# Patient Record
Sex: FEMALE | Race: WHITE | NOT HISPANIC OR LATINO
[De-identification: names, ages, dates, MRNs, and addresses within clinical notes are randomized per-mention and may not be internally consistent; named-entity substitution may affect disease eponyms.]

---

## 2023-06-23 PROBLEM — Z00.00 ENCOUNTER FOR PREVENTIVE HEALTH EXAMINATION: Status: ACTIVE | Noted: 2023-06-23

## 2023-07-14 ENCOUNTER — APPOINTMENT (OUTPATIENT)
Dept: VASCULAR SURGERY | Facility: CLINIC | Age: 88
End: 2023-07-14
Payer: MEDICARE

## 2023-07-14 VITALS — SYSTOLIC BLOOD PRESSURE: 168 MMHG | HEART RATE: 51 BPM | HEIGHT: 66 IN | DIASTOLIC BLOOD PRESSURE: 76 MMHG

## 2023-07-14 PROCEDURE — 93923 UPR/LXTR ART STDY 3+ LVLS: CPT

## 2023-07-14 PROCEDURE — 99204 OFFICE O/P NEW MOD 45 MIN: CPT

## 2023-07-14 RX ORDER — ROSUVASTATIN CALCIUM 5 MG/1
TABLET, FILM COATED ORAL
Refills: 0 | Status: ACTIVE | COMMUNITY

## 2023-07-14 RX ORDER — METOPROLOL TARTRATE 50 MG/1
50 TABLET, FILM COATED ORAL
Refills: 0 | Status: ACTIVE | COMMUNITY

## 2023-07-14 NOTE — HISTORY OF PRESENT ILLNESS
[FreeTextEntry1] : Patient is an 89-year-old female presenting to us with complaints of left first toe pain especially at night.\par \par Patient denies any history of diabetes coronary arteries or smoking.\par \par Patient has minimal ambulation.  No history of tissue loss.\par \par No history of DVT, chest pain or shortness of breath.

## 2023-07-14 NOTE — PHYSICAL EXAM
[JVD] : no jugular venous distention  [Normal Breath Sounds] : Normal breath sounds [Normal Heart Sounds] : normal heart sounds [2+] : left 2+ [Ankle Swelling (On Exam)] : present [Ankle Swelling Bilaterally] : bilaterally  [Ankle Swelling On The Right] : mild [Abdomen Masses] : No abdominal masses [Skin Ulcer] : no ulcer

## 2023-07-14 NOTE — CONSULT LETTER
[Dear  ___] : Dear  [unfilled], [Consult Letter:] : I had the pleasure of evaluating your patient, [unfilled]. [Please see my note below.] : Please see my note below. [Consult Closing:] : Thank you very much for allowing me to participate in the care of this patient.  If you have any questions, please do not hesitate to contact me. [Sincerely,] : Sincerely, [FreeTextEntry3] : Genaro Trejo M.D., F.ROSANNES., R.P.VIOLETA.I.\par  of Vascular Surgery\par Assistant Professor of Radiology\par Director of Endovascular Program/ [facility]\par [facility]

## 2023-07-14 NOTE — ASSESSMENT
[FreeTextEntry1] : Patient with complaint of left first toe pain.\par \par Arterial Dopplers show no evidence of significant arterial insufficiency.\par \par Suspect musculoskeletal origin of pain.\par \par Recommend medical management and work-up.

## 2023-08-04 ENCOUNTER — APPOINTMENT (OUTPATIENT)
Age: 88
End: 2023-08-04

## 2023-08-04 ENCOUNTER — APPOINTMENT (OUTPATIENT)
Dept: VASCULAR SURGERY | Facility: CLINIC | Age: 88
End: 2023-08-04